# Patient Record
Sex: MALE | Race: WHITE | NOT HISPANIC OR LATINO | Employment: FULL TIME | URBAN - METROPOLITAN AREA
[De-identification: names, ages, dates, MRNs, and addresses within clinical notes are randomized per-mention and may not be internally consistent; named-entity substitution may affect disease eponyms.]

---

## 2022-02-01 ENCOUNTER — HOSPITAL ENCOUNTER (EMERGENCY)
Facility: HOSPITAL | Age: 22
Discharge: HOME | End: 2022-02-01
Attending: STUDENT IN AN ORGANIZED HEALTH CARE EDUCATION/TRAINING PROGRAM

## 2022-02-01 ENCOUNTER — APPOINTMENT (EMERGENCY)
Dept: RADIOLOGY | Facility: HOSPITAL | Age: 22
End: 2022-02-01

## 2022-02-01 VITALS
DIASTOLIC BLOOD PRESSURE: 64 MMHG | TEMPERATURE: 97.8 F | BODY MASS INDEX: 23.49 KG/M2 | HEART RATE: 67 BPM | WEIGHT: 155 LBS | RESPIRATION RATE: 18 BRPM | OXYGEN SATURATION: 99 % | SYSTOLIC BLOOD PRESSURE: 124 MMHG | HEIGHT: 68 IN

## 2022-02-01 DIAGNOSIS — S80.02XA CONTUSION OF LEFT KNEE, INITIAL ENCOUNTER: ICD-10-CM

## 2022-02-01 DIAGNOSIS — S80.01XA CONTUSION OF RIGHT KNEE, INITIAL ENCOUNTER: Primary | ICD-10-CM

## 2022-02-01 PROCEDURE — 99284 EMERGENCY DEPT VISIT MOD MDM: CPT | Performed by: STUDENT IN AN ORGANIZED HEALTH CARE EDUCATION/TRAINING PROGRAM

## 2022-02-01 PROCEDURE — 99284 EMERGENCY DEPT VISIT MOD MDM: CPT

## 2022-02-01 PROCEDURE — 73564 X-RAY EXAM KNEE 4 OR MORE: CPT

## 2022-02-01 RX ORDER — IBUPROFEN 600 MG/1
600 TABLET ORAL ONCE
Status: COMPLETED | OUTPATIENT
Start: 2022-02-01 | End: 2022-02-01

## 2022-02-01 RX ADMIN — IBUPROFEN 600 MG: 600 TABLET ORAL at 08:16
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
## 2022-02-01 NOTE — Clinical Note
Reilly Chun was seen and treated in our emergency department on 2/1/2022  No restrictions            Diagnosis:     [place]ice [place]  may return to work on return date  He may return on this date: 02/02/2022         If you have any questions or concerns, please don't hesitate to call        Riley Hardy MD    ______________________________           _______________          _______________  [facility] Representative                              Date                                Time

## 2022-02-01 NOTE — DISCHARGE INSTRUCTIONS
Please continue to use medications (Motrin ibuprofen etc )  For continued management of the pain  Please follow-up with your primary care provider as soon as possible for continued evaluation of your symptoms  Please return to the emergency department if you experience worsening of symptoms that include but are not limited to: Loss of consciousness, chest pain, nausea, vomiting, fevers, chills, increased swelling of your knee joints, inability to bend your knees, the increase in pain that does not respond to over-the-counter medications, inability to bear weight on your legs

## 2022-02-01 NOTE — Clinical Note
Sudeep Valladares was seen and treated in our emergency department on 2/1/2022  No restrictions            Diagnosis:     Chon Chaparro  may return to work on return date  He may return on this date: 02/02/2022         If you have any questions or concerns, please don't hesitate to call        Franko Diehl MD    ______________________________           _______________          _______________  [facility]                              Date                                Time
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
